# Patient Record
Sex: MALE | Race: ASIAN | NOT HISPANIC OR LATINO | Employment: STUDENT | URBAN - METROPOLITAN AREA
[De-identification: names, ages, dates, MRNs, and addresses within clinical notes are randomized per-mention and may not be internally consistent; named-entity substitution may affect disease eponyms.]

---

## 2020-06-05 ENCOUNTER — TELEPHONE (OUTPATIENT)
Dept: PSYCHIATRY | Facility: CLINIC | Age: 25
End: 2020-06-05

## 2020-06-05 NOTE — TELEPHONE ENCOUNTER
-Per patient he is looking for long term management of his OCD.  States he has not had much in the way of treatment for it.  States he is only seeing a therapist right now, and is looking for a long term psychiatrist.  Discussed that our clinic might not be a good fit for this as we usually only act as a consult.     -Writer agreed to reach out to our provider here that specializes in OCD to see if he had any recommendations for psychiatrists in the community that treat OCD.

## 2020-06-05 NOTE — TELEPHONE ENCOUNTER
What is the concern that needs to be addressed by a nurse? Patient is interested in seeking care for OCD.  Patient tried Seratonin to control OCD, but stopped this 3 or 4 months ago.  He felt that his OCD was better on Seratonin but now he feels that it isn't effective.  His symptoms are mostly checking, more related to his academics.  Had racing thoughts in the past but is able to control these a little better. Feels he's had symptoms in his childhood but this has increased now that he has more responsibilities.  Hoping to see if he is appropriate for our clinic, or asking if we could refer to another specialist.    May a detailed message be left on voicemail? Yes or MyChart preferred    Message routed to: Libia Quinones RNCC

## 2020-06-08 NOTE — TELEPHONE ENCOUNTER
Lukas Rodas MD Swanson, Melanie A RN    Caller: Unspecified (3 days ago, 10:52 AM)               I would say [facility], b/c honestly, they are as good as anyone else. And make it clear to him that that means they'll be supervised by experts, including me.    Previous Messages

## 2020-08-25 ENCOUNTER — TELEPHONE (OUTPATIENT)
Dept: DERMATOLOGY | Facility: CLINIC | Age: 25
End: 2020-08-25

## 2020-08-25 ENCOUNTER — VIRTUAL VISIT (OUTPATIENT)
Dept: DERMATOLOGY | Facility: CLINIC | Age: 25
End: 2020-08-25
Payer: COMMERCIAL

## 2020-08-25 DIAGNOSIS — L81.8 PERIORBITAL HYPERPIGMENTATION: Primary | ICD-10-CM

## 2020-08-25 RX ORDER — HYDROQUINONE 40 MG/G
CREAM TOPICAL
Qty: 28.35 G | Refills: 1 | Status: SHIPPED | OUTPATIENT
Start: 2020-08-25

## 2020-08-25 ASSESSMENT — PAIN SCALES - GENERAL: PAINLEVEL: NO PAIN (0)

## 2020-08-25 NOTE — PATIENT INSTRUCTIONS
"[facility] Dermatology Visit    Thank you for allowing us to participate in your care. Your findings, instructions and follow-up plan are as follows:    For the darkness under the eyes, this is essentially your normal skin.  It's called \"periorbital hyperpigmentation\".  It is most important to use a good sunscreen. Best ones have titanium dioxide or zinc oxide. Can try a tinted sunscreen to help it blend in more.  Bleaching creams might help but not always and sometimes can make it worse.   Can start hydroquinone daily to affected area but it's very important to only use daily for 3 months, then take 3 month break.   Insurance does not cover this medication so can check goodrx.com.    For the arms, this is called keratosis pilaris. This is essentially normal skin too.  You can't \"cure\" this but we can try to lessen the appearance.  You can try some creams which you can buy over the counter such as amlactin or urea 10% cream.    If you get pus bumps, which are a sign of inflamed hair follicles, can use benzoyl peroxide 5% wash daily in shower. This can be purchased over the counter at [employer] or [employer] ([employer] makes this product). It can be found in a purple tube in the acne aisle. Be sure to rinse thoroughly with use as it can bleach towels and clothing.    See handouts below for good skin care products and anti-dandruff shampoos.    Return in 3 months, sooner if concerns.    Dandruff Shampoos    What do I use dandruff shampoos for?    Flaking    Redness    Itching    How and when do I use these shampoos?    Rub gently into scalp.    Leave on for at least five minutes before rinsing.    You will feel better with daily use.    As redness, flaking, and itching get better you can use the shampoo less    Using two shampoos with different active elements may work best. Switch shampoos each week.    What shampoos can I buy?  Below is a list of active elements that help with itching, redness, " and flaking. Name brand shampoos having these elements are included.  Non-brand shampoos that have these active elements can also be used.     Selenium Sulfide  o Blue Head and Shoulders (1% selenium sulfide)  o Selsun Blue (1% selenium sulfide)  o Selsun Gold (2.5% selenium sulfide, prescription needed)    Salicylic Acid  o Neutrogena T/Sal  (3% salicylic acid)  o Sebulex  (2% sulfur, 2% salicylic acid)  o Ionil  (2% salicylic acid)    Pyrithione Zinc  o White Head and Shoulders (1% pyrithione zinc)  o DHS Zinc Shampoo (1% pyrithione zinc)    Ketoconazole  o Nizoral  (1% ketoconazole)    Tar (Note: tar may turn white/gray hair yellow if used often)  o Neutrogena T/Gel (0.5% coal tar)  o Neutrogena T/Gel extra strength (1% coal tar)  o DHS Tar Shampoo (0.5% coal tar)        Gentle Skin Care    Below is a list of products our providers recommend for gentle skin care.  Moisturizers:    Lighter; Exederm Intensive Moisture Cream, Cetaphil Cream, CeraVe, Aveeno Positively radiant and Vanicream Light     Thicker; Aquaphor Ointment, Vaseline, Petroleum Jelly, Eucerin Original Healing Cream and Vanicream, CeraVe Healing Ointment, Aquaphor Body Spray    Avoid Lotions (too thin)  Mild Cleansers:    Dove- Fragrance Free bar or wash    CeraVe     Vanicream Cleansing bar    Cetaphil Cleanser     Aquaphor 2 in1 Gentle Wash and Shampoo    Dove Baby wash    Exederm Body wash       Laundry Products:      All Free and Clear    Cheer Free    Generic Brands are okay as long as they are  Fragrance Free      Avoid fabric softeners  and dryer sheets   Sunscreens: SPF 30 or greater       Sunscreens that contain Zinc Oxide and/or Titanium Dioxide should be applied, these are physical blockers. One or both of these should be listed in the  Active Ingredients     Any other listed ingredients under the active ingredients would be a chemically based sunscreen which might be irritating.    Spray sunscreens should be avoided because these are  typically chemical sunscreens.      Shampoo and Conditioners:    Free and Clear by Vanicream    Aquaphor 2 in 1 Gentle Wash and Shampoo   Oils:    Mineral Oil     Emu Oil     For some patients: Coconut (raw, unrefined, organic) and Sunflower seed oil        Generic Products are an okay substitute, but make sure they are fragrance free.  *Reading the product ingredients list is very important  *Avoid product that have fragrance added to them.   *Organic does not mean  fragrance free.  In fact patients with sensitive skin can become quite irritated by some organic products.     1. Daily bathing is recommended. Make sure you are applying a good moisturizer after bathing every time.  2. Use Moisturizing creams at least twice daily to the whole body. Your provider may recommend a lighter or heavier moisturizer based on your child s severity and that time of year it is.  3. Creams are more moisturizing than lotions.     Care Plan:  1. Keep bathing and showering short, less than 15 minutes   2. Always use lukewarm warm when possible. AVOID HOT or COLD water  3. DO NOT use bubble bath  4. Limit the use of soaps. Focus on the skin folds, face, armpits, groin and feet towards the end of the bath  5. Do NOT vigorously scrub when you cleanse the skin  6. After bathing, PAT your skin lightly with a towel. DO NOT rub or scrub when drying  7. ALWAYS apply a moisturizer immediately after bathing. This helps to  lock in  the moisture. * IF YOU WERE PRESCRIBED A TOPICAL MEDICATION, APPLY YOUR MEDICATION FIRST THEN COVER WITH YOUR DAILY MOISTURIZER  8. Reapply moisturizing agents at least twice daily to your whole body    Other helpful tips:    Do not use products such as powders, perfumes, or colognes on your skin    Diffusers can be harsh on sensitive skin, use with caution if you or your child has sensitive skin     Avoid saunas and steam baths. This temperature is too HOT    Avoid tight or  scratchy  clothing such as  wool    Always wash new clothing before wearing them for the first time  Sometimes a humidifier or vaporizer can be used at night can help the dry skin. Remember to keep these items clean to avoid mold growth.      When should I call my doctor?    If you are worsening or not improving, please, contact us or seek urgent care as noted below.     Who should I call with questions (adults)?    [facility] (adult and pediatric): [phone]     [facility] (adult): [phone]    For urgent needs outside of business hours call the [facility] at [phone] and ask for the dermatology resident on call    If this is a medical emergency and you are unable to reach an ER, Call [phone]      Who should I call with questions (pediatric)?  [facility] Pediatric Dermatology  Dr. Angie Tejada, Dr. Lesia Stovall, Dr. Ivelisse Arriaga, Diann Mathews, PA  Dr. Ronit Hernández, Dr. Nettie Angulo & Dr. Remigio Foster  Non Urgent  Nurse Triage Line; [phone]- Patsy and Radha PHAN Care Coordinators   Samantha (/Complex ) [phone]    If you need a prescription refill, please contact your pharmacy. Refills are approved or denied by our Physicians during normal business hours, Monday through Fridays  Per office policy, refills will not be granted if you have not been seen within the past year (or sooner depending on your child's condition)    Scheduling Information:  Pediatric Appointment Scheduling and Call Center [phone]  Radiology Scheduling- [phone]  Sedation Unit Scheduling- [phone]  [place] Scheduling- General [phone]; Pediatric Dermatology [phone]  Main  Services: [phone]  Romanian: [phone]  Bahraini: [phone]  Hmong/Graham/Bulgarian: [phone]  Preadmission Nursing Department Fax Number: [phone] (Fax all pre-operative paperwork to this number)    For  urgent matters arising during evenings, weekends, or holidays that cannot wait for normal business hours please call [phone] and ask for the Dermatology Resident On-Call to be paged.

## 2020-08-25 NOTE — PROGRESS NOTES
"[facility] Dermatology Record:  Store and Forward and Video ( Invitation sent by:  Boxever waiting room )      Dermatology Problem List:  ***    Encounter Date: Aug 25, 2020    CC:   Chief Complaint   Patient presents with     Derm Problem     Toni is wanting to discuss dark pigmentation under his eyes and bumps on his arms.       History of Present Illness:  Toni Don is a 25 year old male who presents for ***.      ROS: Patient is generally feeling well today ***    Physical Examination:  General: Well-appearing***, appropriately-developed individual.  Skin: Focused examination including *** was performed.   -***    Labs:  ***    Past Medical History:   There is no problem list on file for this patient.    History reviewed. No pertinent past medical history.  History reviewed. No pertinent surgical history.    Social History:  Patient reports that he has never smoked. He has never used smokeless tobacco.    Family History:  History reviewed. No pertinent family history.    Medications:  No current outpatient medications on file.     No current facility-administered medications for this visit.           No Known Allergies        Impression and Recommendations (Patient Counseled on the Following):  1. ***  -{plan:482314}    2. ***  -{plan:451022}      Follow-up:   {follow-up:235628}     {umdermtelestaffinvolved:002271::\"Staff only\"}    ***  _____________________________________________________________________________    Teledermatology information:  - Location of patient: [place]  - Patient presented as: {self referral other:077781::\"return\"}  - Location of teledermatologist:  ([facility] DERMATOLOGY )  - Reason teledermatology is appropriate:  {umdermtelereason:071070}  - Image quality and interpretability: {imagequality:117386}  - Physician has received verbal consent for a Video/Photos Visit from the patient? {YES-NO  Default Yes:4444::\"Yes\"}  - In-person dermatology visit recommendation: {visit " needed:908348}  - Date of images: ***  - Service start time:***  - Service end time:***  - Date of report: 8/25/2020

## 2020-08-25 NOTE — TELEPHONE ENCOUNTER
Called pt and LVM. I wanted to schedule him a 3 month telephone with photos follow up with Dr. Pierre. Clinic number provided.  BHAVNA Kelly

## 2020-08-25 NOTE — NURSING NOTE
Dermatology Rooming Note    Toni Don's goals for this visit include:   Chief Complaint   Patient presents with     Derm Problem     Toni is wanting to discuss dark pigmentation under his eyes and bumps on his arms.     BHAVNA Kelly      No

## 2020-08-25 NOTE — LETTER
8/25/2020       RE: Toni Don  [address]  [place] [address]     Dear Colleague,    Thank you for referring your patient, Toni Don, to the [facility] DERMATOLOGY at [facility]. Please see a copy of my visit note below.    [facility] Dermatology Record:  [facility]      Dermatology Problem List:  1. Periorbital hyperpigmentation.  - Pt to consider hydroquinone  2. Keratosis pilaris.    Encounter Date: Aug 25, 2020    CC:   Chief Complaint   Patient presents with     Derm Problem     Toni is wanting to discuss dark pigmentation under his eyes and bumps on his arms.       History of Present Illness:  Toni Don is a 25 year old male who presents for darkness under eyes and spots on arms.    Started noticing dark circles under eyes about 3 years. Getting worse.    Has bumps on arms. Been like there forever.  They become dead skin over time.  Used to get pus bumps but not really anymore.  No history of asthma, allergies, or eczema.    No personal or family history of skin cancer.     Has some qs on shampoos and skin care in general.    ROS: Patient is generally feeling well today.    Physical Examination:  General: Well-appearing male, appropriately-developed individual.  Skin: Focused examination including posterior shoulder/arm and face/neck/upper chest was performed.   -few scattered pustules on posterior upper arm  -mild periorbital hyperpigmentation.             Labs:  n/a    Past Medical History:   There is no problem list on file for this patient.    History reviewed. No pertinent past medical history.  History reviewed. No pertinent surgical history.    Social History:  Patient reports that he has never smoked. He has never used smokeless tobacco.    Family History:  History reviewed. No pertinent family history.    Medications:  No current outpatient medications on file.     No current facility-administered medications for this visit.           No  Known Allergies        Impression and Recommendations (Patient Counseled on the Following):  1. Periorbital hyperpigmentation. Natural history and etiology discussed. Advised this is essentially normal skin type. Could try bleaching creams but may not be effective and may have paradoxical darkening with overuse. He may consider hydroquinone and asked that prescription be sent. Strict photoprotection also advised.  -Patient to consider hydroquinone - if he does use, advised to start hydroquinone 4% cream daily for up to 3 months, then take 3 month break. Counseled on side effect of ochronosis.  -Strict photoprotection advised.    2. Keratosis pilaris. Natural history and etiology discussed. Advised unable to cure but can sometimes improve appearance with topical keratolytics. May also have admixed folliculitis and advised could trial BP wash.  - Consider urea 10% cream or Amlactin daily  - Consider starting benzoyl peroxide 5% wash daily in shower. Counseled can bleach towels and clothing.    3. Gentle skin cares:  - Discussed daily moisturization with bland emollient.   - Provided list of gentle moisturizers, facial cleansers, anti-dandruff shampoos      Follow-up:   Follow-up with dermatology in approximately 3 months. Earlier for new or changing lesions or rash.      Staff only    Staci Pierre MD    Department of Dermatology  [facility]: Phone: [phone], Fax: [phone]  8/25/2020    _____________________________________________________________________________    Teledermatology information:  - Location of patient: [place]  - Patient presented as: self referral  - Location of teledermatologist:  ([facility] DERMATOLOGY )  - Reason teledermatology is appropriate:  of National Emergency Regarding Coronavirus disease (COVID 19) Outbreak  - Image quality and interpretability: acceptable  - Physician has received  verbal consent for a Video/Photos Visit from the patient? Yes  - In-person dermatology visit recommendation: no  - Date of images: 8/24/2020  - Service start time: 8:13 AM   - Service end time: 8:26 AM   - Date of report: 8/25/2020

## 2020-08-25 NOTE — PROGRESS NOTES
[facility] Dermatology Record:  [facility]      Dermatology Problem List:  1. Periorbital hyperpigmentation.  - Pt to consider hydroquinone  2. Keratosis pilaris.    Encounter Date: Aug 25, 2020    CC:   Chief Complaint   Patient presents with     Derm Problem     Toni is wanting to discuss dark pigmentation under his eyes and bumps on his arms.       History of Present Illness:  Toni Don is a 25 year old male who presents for darkness under eyes and spots on arms.    Started noticing dark circles under eyes about 3 years. Getting worse.    Has bumps on arms. Been like there forever.  They become dead skin over time.  Used to get pus bumps but not really anymore.  No history of asthma, allergies, or eczema.    No personal or family history of skin cancer.     Has some qs on shampoos and skin care in general.    ROS: Patient is generally feeling well today.    Physical Examination:  General: Well-appearing male, appropriately-developed individual.  Skin: Focused examination including posterior shoulder/arm and face/neck/upper chest was performed.   -few scattered pustules on posterior upper arm  -mild periorbital hyperpigmentation.             Labs:  n/a    Past Medical History:   There is no problem list on file for this patient.    History reviewed. No pertinent past medical history.  History reviewed. No pertinent surgical history.    Social History:  Patient reports that he has never smoked. He has never used smokeless tobacco.    Family History:  History reviewed. No pertinent family history.    Medications:  No current outpatient medications on file.     No current facility-administered medications for this visit.           No Known Allergies        Impression and Recommendations (Patient Counseled on the Following):  1. Periorbital hyperpigmentation. Natural history and etiology discussed. Advised this is essentially normal skin type. Could try bleaching creams but may not be effective and may  have paradoxical darkening with overuse. He may consider hydroquinone and asked that prescription be sent. Strict photoprotection also advised.  -Patient to consider hydroquinone - if he does use, advised to start hydroquinone 4% cream daily for up to 3 months, then take 3 month break. Counseled on side effect of ochronosis.  -Strict photoprotection advised.    2. Keratosis pilaris. Natural history and etiology discussed. Advised unable to cure but can sometimes improve appearance with topical keratolytics. May also have admixed folliculitis and advised could trial BP wash.  - Consider urea 10% cream or Amlactin daily  - Consider starting benzoyl peroxide 5% wash daily in shower. Counseled can bleach towels and clothing.    3. Gentle skin cares:  - Discussed daily moisturization with bland emollient.   - Provided list of gentle moisturizers, facial cleansers, anti-dandruff shampoos      Follow-up:   Follow-up with dermatology in approximately 3 months. Earlier for new or changing lesions or rash.      Staff only    Staci Pierre MD    Department of Dermatology  [facility]: Phone: [phone], Fax: [phone]  8/25/2020    _____________________________________________________________________________    Teledermatology information:  - Location of patient: [place]  - Patient presented as: self referral  - Location of teledermatologist:  ([facility] DERMATOLOGY )  - Reason teledermatology is appropriate:  of National Emergency Regarding Coronavirus disease (COVID 19) Outbreak  - Image quality and interpretability: acceptable  - Physician has received verbal consent for a Video/Photos Visit from the patient? Yes  - In-person dermatology visit recommendation: no  - Date of images: 8/24/2020  - Service start time: 8:13 AM   - Service end time: 8:26 AM   - Date of report: 8/25/2020

## 2020-11-25 ENCOUNTER — VIRTUAL VISIT (OUTPATIENT)
Dept: DERMATOLOGY | Facility: CLINIC | Age: 25
End: 2020-11-25
Payer: COMMERCIAL

## 2020-11-25 DIAGNOSIS — L81.8 PERIORBITAL HYPERPIGMENTATION: Primary | ICD-10-CM

## 2020-11-25 PROCEDURE — 99213 OFFICE O/P EST LOW 20 MIN: CPT | Mod: 95 | Performed by: DERMATOLOGY

## 2020-11-25 RX ORDER — TRETINOIN 0.25 MG/G
CREAM TOPICAL
Qty: 45 G | Refills: 11 | Status: SHIPPED | OUTPATIENT
Start: 2020-11-25

## 2020-11-25 ASSESSMENT — PAIN SCALES - GENERAL: PAINLEVEL: NO PAIN (0)

## 2020-11-25 NOTE — NURSING NOTE
"Dermatology Rooming Note    Toni Don's goals for this visit include:   Chief Complaint   Patient presents with     Derm Problem     Toni is following up on periorbital hyperpigmentation- Toni states \"I think it's improved\".      Becky Wu, RMA     "

## 2020-11-25 NOTE — LETTER
"11/25/2020       RE: Toni Don  [address]  [facility] [address]     Dear Colleague,    Thank you for referring your patient, Toni Don, to the [facility] DERMATOLOGY CLINIC [place] at [facility]. Please see a copy of my visit note below.    [facility] Dermatology Record:  Store and Forward and Video ( Invitation sent by:  [employer] waiting room )      Dermatology Problem List:  1. Periorbital hyperpigmentation.  - Has hydroquinone, tretinoin  2. Keratosis pilaris.  - Amalctin or urea 10% cream  3. Folliculitis.  - BP wash    Encounter Date: Nov 25, 2020    CC:   Chief Complaint   Patient presents with     Derm Problem     Toni is following up on periorbital hyperpigmentation- Toni states \"I think it's improved\".        History of Present Illness:  Toni Don is a 25 year old male who presents for follow-up periorbital hyperpigmentation.    We last talked 8/25/2020 and started hydroquinone and sun protection.  Since last visit, he has been doing well.  Hasn't been using the hydroquinone because was stressed with recent exam.    Recently started eating more eggs/protein and working out more. He notes white bumps on his back and arms.    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      ROS: Patient is generally feeling well today.    Physical Examination:  General: Well-appearing male, appropriately-developed individual.  Skin: Focused examination including face was performed.   -mild hyperpigmentation infraorbital cheeks.            Labs:  n/a    Past Medical History:   There is no problem list on file for this patient.    No past medical history on file.  No past surgical history on file.    Social History:  Patient reports that he has never smoked. He has never used smokeless tobacco.    Family History:  No family history on file.    Medications:  Current Outpatient Medications   Medication     " hydroquinone (RENETTA) 4 % external cream     No current facility-administered medications for this visit.           No Known Allergies        Impression and Recommendations (Patient Counseled on the Following):  1. Periorbital hyperpigmentation, he notes some improvement recently with improvement in sleep. Didn't use hydroquinone much. Discussed options today of adding tretinoin versus more consistent trial of hydroquinone versus watchful waiting. He will consider and may trial some of these options. He is aware he needs to take a break on the hydroquinone if he does use.  - Continue photoprotection  - Consider 3-month trial of hydroquinone  - Consider tretinoin 0.025% cream at bedtime. Counseled on side effects of xerosis. Recommended to start every other night, then increase to nightly as tolerated. Follow with non-comedogenic moisturizer.  - Continue photoprotection    2. Folliculitis (per history, no photos). Exacerbated by exercise and high protein foods.  - Start benzoyl peroxide 5% wash daily in shower. Counseled can bleach towels and clothing.  - If doesn't help, he will send message and we can send clindamycin lotion.    3. Keratosis pilaris. Natural history and etiology discussed.  - Advised can trial urea 10% cream or amlactin if desired    Follow-up:   Follow-up with dermatology prn. Earlier for new or changing lesions or rash.      Staff only    Staci Pierre MD    Department of Dermatology  [facility]: Phone: [phone], Fax: [phone]  11/25/2020    _____________________________________________________________________________    Teledermatology information:  - Location of patient: [place]  - Patient presented as: return  - Location of teledermatologist:  ([facility] DERMATOLOGY CLINIC [place] )  - Image quality and interpretability: acceptable  - Physician has received verbal consent for  a Video/Photos Visit from the patient? YES  - In-person dermatology visit recommendation: no  - Date of images: 11/24/2020  - Service start time: 7:55 AM  - Service end time: 8:06 AM   - Date of report: 11/25/2020

## 2020-11-25 NOTE — PROGRESS NOTES
"[facility] Dermatology Record:  Store and Forward and Video ( Invitation sent by:  "[employer] waiting room )      Dermatology Problem List:  1. Periorbital hyperpigmentation.  - Has hydroquinone, tretinoin  2. Keratosis pilaris.  - Amalctin or urea 10% cream  3. Folliculitis.  - BP wash    Encounter Date: Nov 25, 2020    CC:   Chief Complaint   Patient presents with     Derm Problem     Toni is following up on periorbital hyperpigmentation- Toni states \"I think it's improved\".        History of Present Illness:  Toni Don is a 25 year old male who presents for follow-up periorbital hyperpigmentation.    We last talked 8/25/2020 and started hydroquinone and sun protection.  Since last visit, he has been doing well.  Hasn't been using the hydroquinone because was stressed with recent exam.    Recently started eating more eggs/protein and working out more. He notes white bumps on his back and arms.    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      ROS: Patient is generally feeling well today.    Physical Examination:  General: Well-appearing male, appropriately-developed individual.  Skin: Focused examination including face was performed.   -mild hyperpigmentation infraorbital cheeks.            Labs:  n/a    Past Medical History:   There is no problem list on file for this patient.    No past medical history on file.  No past surgical history on file.    Social History:  Patient reports that he has never smoked. He has never used smokeless tobacco.    Family History:  No family history on file.    Medications:  Current Outpatient Medications   Medication     hydroquinone (RENETTA) 4 % external cream     No current facility-administered medications for this visit.           No Known Allergies        Impression and Recommendations (Patient Counseled on the Following):  1. Periorbital hyperpigmentation, he notes some improvement recently with improvement in sleep. " Didn't use hydroquinone much. Discussed options today of adding tretinoin versus more consistent trial of hydroquinone versus watchful waiting. He will consider and may trial some of these options. He is aware he needs to take a break on the hydroquinone if he does use.  - Continue photoprotection  - Consider 3-month trial of hydroquinone  - Consider tretinoin 0.025% cream at bedtime. Counseled on side effects of xerosis. Recommended to start every other night, then increase to nightly as tolerated. Follow with non-comedogenic moisturizer.  - Continue photoprotection    2. Folliculitis (per history, no photos). Exacerbated by exercise and high protein foods.  - Start benzoyl peroxide 5% wash daily in shower. Counseled can bleach towels and clothing.  - If doesn't help, he will send message and we can send clindamycin lotion.    3. Keratosis pilaris. Natural history and etiology discussed.  - Advised can trial urea 10% cream or amlactin if desired    Follow-up:   Follow-up with dermatology prn. Earlier for new or changing lesions or rash.      Staff only    Staci Pierre MD    Department of Dermatology  [facility]: Phone: [phone], Fax: [phone]  11/25/2020    _____________________________________________________________________________    Teledermatology information:  - Location of patient: [place]  - Patient presented as: return  - Location of teledermatologist:  ([facility] DERMATOLOGY CLINIC [place] )  - Image quality and interpretability: acceptable  - Physician has received verbal consent for a Video/Photos Visit from the patient? YES  - In-person dermatology visit recommendation: no  - Date of images: 11/24/2020  - Service start time: 7:55 AM  - Service end time: 8:06 AM   - Date of report: 11/25/2020

## 2020-11-25 NOTE — PATIENT INSTRUCTIONS
[facility] Dermatology Visit    Thank you for allowing us to participate in your care. Your findings, instructions and follow-up plan are as follows:    For your eyes (periorbital hyperpgimentation):  - Can trial: tretinoin 0.025% cream at bedtime. Apply pea-sized amount to face. This can be drying so please start every other night, then increase to nightly as tolerated after 1-2 weeks. You can use this lifelong. Check goodrx.com for coupons.  - Can trial hydroquinone nightly (can only use for 3 months, then need 3 month break).  - Can do nothing :)    For keratosis pilaris:  - Try urea 10% cream or amlactin    For folliculitis:  - Start benzoyl peroxide 5% wash daily in shower. This can be purchased over the counter at [employer] or [employer] ([employer] makes this product). It can be found in a purple tube in the acne aisle. Be sure to rinse thoroughly with use as it can bleach towels and clothing.  - If doesn't help, send me a message and I can send you a topical antibiotic called clindamycin.    Return as needed, good luck with your phd!      When should I call my doctor?    If you are worsening or not improving, please, contact us or seek urgent care as noted below.     Who should I call with questions (adults)?    [facility] (adult and pediatric): [phone]     [facility] (adult): [phone]    For urgent needs outside of business hours call the [facility] at [phone] and ask for the dermatology resident on call    If this is a medical emergency and you are unable to reach an ER, Call [phone]      Who should I call with questions (pediatric)?  [facility]- Pediatric Dermatology  Dr. Angie Tejada, Dr. Lesia Stovall, Dr. Ivelisse Arriaga, Diann Mathews, CLARITA eHrnández, Dr. Nettie Angulo & Dr. Remigio Foster  Non Urgent  Nurse Triage Line; [phone]- Patsy and Radha PHAN Care  Coordinators   Samantha (/Complex ) [phone]    If you need a prescription refill, please contact your pharmacy. Refills are approved or denied by our Physicians during normal business hours, Monday through Fridays  Per office policy, refills will not be granted if you have not been seen within the past year (or sooner depending on your child's condition)    Scheduling Information:  Pediatric Appointment Scheduling and Call Center [phone]  Radiology Scheduling- [phone]  Sedation Unit Scheduling- [phone]  [place] Scheduling- [facility] [phone]; Pediatric Dermatology [phone]  Main  Services: [phone]  Dominican: [phone]  Marshallese: [phone]  Hmong/Latvian/Citizen of the [place]: [phone]  Preadmission Nursing Department Fax Number: [phone] (Fax all pre-operative paperwork to this number)    For urgent matters arising during evenings, weekends, or holidays that cannot wait for normal business hours please call [phone] and ask for the Dermatology Resident On-Call to be paged.

## 2021-01-04 ENCOUNTER — HEALTH MAINTENANCE LETTER (OUTPATIENT)
Age: 26
End: 2021-01-04

## 2021-04-09 ENCOUNTER — IMMUNIZATION (OUTPATIENT)
Dept: NURSING | Facility: CLINIC | Age: 26
End: 2021-04-09
Payer: COMMERCIAL

## 2021-04-09 PROCEDURE — 91300 PR COVID VAC PFIZER DIL RECON 30 MCG/0.3 ML IM: CPT

## 2021-04-09 PROCEDURE — 0001A PR COVID VAC PFIZER DIL RECON 30 MCG/0.3 ML IM: CPT

## 2021-04-30 ENCOUNTER — IMMUNIZATION (OUTPATIENT)
Dept: NURSING | Facility: CLINIC | Age: 26
End: 2021-04-30
Attending: PHARMACIST
Payer: COMMERCIAL

## 2021-04-30 PROCEDURE — 91300 PR COVID VAC PFIZER DIL RECON 30 MCG/0.3 ML IM: CPT

## 2021-04-30 PROCEDURE — 0002A PR COVID VAC PFIZER DIL RECON 30 MCG/0.3 ML IM: CPT

## 2021-05-31 ENCOUNTER — NURSE TRIAGE (OUTPATIENT)
Dept: NURSING | Facility: CLINIC | Age: 26
End: 2021-05-31

## 2021-05-31 ENCOUNTER — APPOINTMENT (OUTPATIENT)
Dept: GENERAL RADIOLOGY | Facility: CLINIC | Age: 26
End: 2021-05-31
Attending: INTERNAL MEDICINE
Payer: COMMERCIAL

## 2021-05-31 ENCOUNTER — HOSPITAL ENCOUNTER (EMERGENCY)
Facility: CLINIC | Age: 26
Discharge: HOME | End: 2021-05-31
Attending: INTERNAL MEDICINE | Admitting: INTERNAL MEDICINE
Payer: COMMERCIAL

## 2021-05-31 VITALS
WEIGHT: 144 LBS | HEART RATE: 48 BPM | SYSTOLIC BLOOD PRESSURE: 128 MMHG | DIASTOLIC BLOOD PRESSURE: 74 MMHG | OXYGEN SATURATION: 98 % | RESPIRATION RATE: 14 BRPM | BODY MASS INDEX: 22.6 KG/M2 | HEIGHT: 67 IN | TEMPERATURE: 98.3 F

## 2021-05-31 DIAGNOSIS — S93.402A SPRAIN OF LEFT ANKLE, UNSPECIFIED LIGAMENT, INITIAL ENCOUNTER: ICD-10-CM

## 2021-05-31 PROCEDURE — 73610 X-RAY EXAM OF ANKLE: CPT | Mod: LT

## 2021-05-31 PROCEDURE — 73610 X-RAY EXAM OF ANKLE: CPT | Mod: 26 | Performed by: RADIOLOGY

## 2021-05-31 PROCEDURE — 99282 EMERGENCY DEPT VISIT SF MDM: CPT | Performed by: INTERNAL MEDICINE

## 2021-05-31 PROCEDURE — 99283 EMERGENCY DEPT VISIT LOW MDM: CPT | Performed by: INTERNAL MEDICINE

## 2021-05-31 ASSESSMENT — ENCOUNTER SYMPTOMS
NECK STIFFNESS: 0
ARTHRALGIAS: 0
FEVER: 0
ABDOMINAL PAIN: 0
SHORTNESS OF BREATH: 0
COLOR CHANGE: 0
EYE REDNESS: 0
HEADACHES: 0
CONFUSION: 0
DIFFICULTY URINATING: 0

## 2021-05-31 ASSESSMENT — MIFFLIN-ST. JEOR: SCORE: 1596.81

## 2021-05-31 NOTE — TELEPHONE ENCOUNTER
Toni has pain in his ankle after spraining it playing games last night.  He might go to urgent care today to have checked out otherwise he can call [facility] tomorrow as needed.  He agrees to this plan.     Reason for Disposition    Minor ankle or foot injury    Protocols used: ANKLE AND FOOT INJURY-A-[place]

## 2021-05-31 NOTE — ED TRIAGE NOTES
Pt 'rolled' ankle last night playing basketball 19:30> Pt took ibuprofen last night. No OTC meds taken today.

## 2021-05-31 NOTE — ED PROVIDER NOTES
[place] EMERGENCY DEPARTMENT ([facility])  5/31/21     History     Chief Complaint   Patient presents with     Ankle Pain     Sprain     The history is provided by the patient and medical records.     Toni Don is an otherwise healthy 25 year old male who presents here to the Emergency Department due to left ankle pain.  Patient reports he was playing basketball last night when his hurt his left ankle.  He states it twisted outwards.  Patient states he was able to continue playing and has been able to walk.  He states he woke up in the middle of the night with pain at the medial aspect of his left ankle.  Patient notes he took some pain medication and went back to bed.      Past Medical History  History reviewed. No pertinent past medical history.  History reviewed. No pertinent surgical history.  hydroquinone (RENETTA) 4 % external cream  tretinoin (RETIN-A) 0.025 % external cream      No Known Allergies  Family History  No family history on file.  Social History   Social History     Tobacco Use     Smoking status: Never Smoker     Smokeless tobacco: Never Used   Substance Use Topics     Alcohol use: None     Drug use: None      Past medical history, past surgical history, medications, allergies, family history, and social history were reviewed with the patient. No additional pertinent items.       Review of Systems   Constitutional: Negative for fever.   HENT: Negative for congestion.    Eyes: Negative for redness.   Respiratory: Negative for shortness of breath.    Cardiovascular: Negative for chest pain.   Gastrointestinal: Negative for abdominal pain.   Genitourinary: Negative for difficulty urinating.   Musculoskeletal: Negative for arthralgias and neck stiffness.        Pos for L ankle pain   Skin: Negative for color change.   Neurological: Negative for headaches.   Psychiatric/Behavioral: Negative for confusion.   All other systems reviewed and are negative.    A complete review of  "systems was performed with pertinent positives and negatives noted in the HPI, and all other systems negative.    Physical Exam   BP: 138/68  Pulse: 69  Temp: 98.4  F (36.9  C)  Resp: 16  Height: 170.2 cm (5' 7\")  Weight: 65.3 kg (144 lb)  SpO2: 97 %  Physical Exam  Constitutional:       General: He is not in acute distress.     Appearance: He is not diaphoretic.   HENT:      Head: Atraumatic.   Eyes:      General: No scleral icterus.     Pupils: Pupils are equal, round, and reactive to light.   Cardiovascular:      Rate and Rhythm: Regular rhythm.      Heart sounds: Normal heart sounds.   Pulmonary:      Effort: No respiratory distress.      Breath sounds: Normal breath sounds.   Chest:      Chest wall: No tenderness.   Abdominal:      General: Bowel sounds are normal.      Palpations: Abdomen is soft.      Tenderness: There is no abdominal tenderness.   Musculoskeletal:      Left ankle: He exhibits normal range of motion, no swelling, no ecchymosis, no deformity, no laceration and normal pulse. Tenderness. No lateral malleolus, no medial malleolus, no AITFL, no CF ligament, no posterior TFL, no head of 5th metatarsal and no proximal fibula tenderness found. Achilles tendon normal.      Cervical back: He exhibits no tenderness.      Thoracic back: He exhibits no tenderness.      Lumbar back: He exhibits no tenderness.        Feet:    Skin:     General: Skin is warm.      Findings: No rash.   Neurological:      Mental Status: He is oriented to person, place, and time.         ED Course     10:24 AM  The patient was seen and examined by Yash More MD in Room ED16.    Procedures               Results for orders placed or performed during the hospital encounter of 05/31/21   XR Ankle Left G/E 3 Views     Status: None    Narrative    3 views left ankle radiographs 5/31/2021 10:57 AM    History: pain    Comparison: None available.    Findings:    Standing AP, oblique, and lateral  views of the left ankle " were  obtained.     No acute osseous abnormality.      Ankle mortise and syndesmosis are congruent on this non-weight bearing  images.    Mild soft tissue swelling/prominence over lateral malleolus.      Impression    Impression:  1. Mild soft tissue swelling/prominence over lateral malleolus. No  acute osseous abnormality.  2. No substantial degenerative change.    ALLIE TINOCO     Medications - No data to display     Assessments & Plan (with Medical Decision Making)  Left ankle injury, XR neg, exam mildly tender on tendon but no erythema or swelling, most consistent with sprain/strain, will discharge with RICE, follow up with his PMD.       I have reviewed the nursing notes. I have reviewed the findings, diagnosis, plan and need for follow up with the patient.    Discharge Medication List as of 5/31/2021 11:37 AM          Final diagnoses:   Sprain of left ankle, unspecified ligament, initial encounter   I, Allyson Owens, am serving as a trained medical scribe to document services personally performed by Yash More MD, based on the provider's statements to me.     IYash MD, was physically present and have reviewed and verified the accuracy of this note documented by Allyson Owens.     --  Yash More MD  [facility] EMERGENCY DEPARTMENT  5/31/2021     Yash More MD  05/31/21 [id number]

## 2021-06-27 ENCOUNTER — NURSE TRIAGE (OUTPATIENT)
Dept: NURSING | Facility: CLINIC | Age: 26
End: 2021-06-27

## 2021-06-27 NOTE — TELEPHONE ENCOUNTER
"  Triage Call:    Patient is calling as he has been having diarrhea during the night starting at 0400.  He has had x2 diarrhea stools, the one at 0800 was very watery.   He is having a \"slight body pain\" and feels he may have a low grade fever, but has no thermometer.  He started having a lot of gas last night after eating a [place] sor[place] sandwhich.        Pt was advised of protocol recommendation/disposition of homecare for mild diarrhea.  Encouraged to call back if it gets worse.     Ally Thornton RN on 6/27/2021 at 8:32 AM        COVID 19 Nurse Triage Plan/Patient Instructions    Please be aware that novel coronavirus (COVID-19) may be circulating in the community. If you develop symptoms such as fever, cough, or SOB or if you have concerns about the presence of another infection including coronavirus (COVID-19), please contact your health care provider or visit [email or web address].     Disposition/Instructions    Home care recommended. Follow home care protocol based instructions.    Thank you for taking steps to prevent the spread of this virus.  o Limit your contact with others.  o Wear a simple mask to cover your cough.  o Wash your hands well and often.    Resources    [facility]: About COVID-19: [email or web address]    CDC: What to Do If You're Sick: www.cdc.gov/coronavirus/2019-ncov/about/steps-when-sick.html    CDC: Ending Home Isolation: www.cdc.gov/coronavirus/2019-ncov/hcp/disposition-in-home-patients.html     CDC: Caring for Someone: www.cdc.gov/coronavirus/2019-ncov/if-you-are-sick/care-for-someone.html     [facility]: Interim Guidance for Hospital Discharge to Home: www.health.[facility].[place]/diseases/coronavirus/hcp/hospdischarge.pdf    [facility] clinical trials (COVID-19 research studies): clinicalaffairs.[facility]/umn-clinical-trials     Below are the COVID-19 hotlines at the [place] Department of Health ([facility]). Interpreters are available.   o For health questions: Call " 696.929.9307 or 1-294.865.6360 (7 a.m. to 7 p.m.)  o For questions about schools and childcare: Call 308-668-3561 or 1-298.698.9867 (7 a.m. to 7 p.m.)                   Reason for Disposition    [1] MILD diarrhea AND [2] taking antibiotics    Additional Information    Negative: Shock suspected (e.g., cold/pale/clammy skin, too weak to stand, low BP, rapid pulse)    Negative: Difficult to awaken or acting confused (e.g., disoriented, slurred speech)    Negative: Sounds like a life-threatening emergency to the triager    Negative: Vomiting also present and worse than the diarrhea    Negative: [1] Blood in stool AND [2] without diarrhea    Negative: Diarrhea in a cancer patient who is currently (or recently) receiving chemotherapy or radiation therapy, or cancer patient who has metastatic or end-stage cancer and is receiving palliative care    Negative: [1] SEVERE abdominal pain (e.g., excruciating) AND [2] present > 1 hour    Negative: [1] SEVERE abdominal pain AND [2] age > 60    Negative: [1] Blood in the stool AND [2] moderate or large amount of blood    Negative: Black or tarry bowel movements  (Exception: chronic-unchanged  black-grey bowel movements AND is taking iron pills or Pepto-bismol)    Negative: [1] Drinking very little AND [2] dehydration suspected (e.g., no urine > 12 hours, very dry mouth, very lightheaded)    Negative: Patient sounds very sick or weak to the triager    Negative: [1] SEVERE diarrhea (e.g., 7 or more times / day more than normal) AND [2] age > 60 years    Negative: [1] Constant abdominal pain AND [2] present > 2 hours    Negative: [1] Fever > 103 F (39.4 C) AND [2] not able to get the fever down using Fever Care Advice    Negative: [1] SEVERE diarrhea (e.g., 7 or more times / day more than normal) AND [2] present > 24 hours (1 day)    Negative: [1] MODERATE diarrhea (e.g., 4-6 times / day more than normal) AND [2] present > 48 hours (2 days)    Negative: [1] MODERATE diarrhea (e.g.,  4-6 times / day more than normal) AND [2] age > 70 years    Negative: Fever > 101 F (38.3 C)    Negative: Fever present > 3 days (72 hours)    Negative: Abdominal pain  (Exception: Pain clears with each passage of diarrhea stool)    Negative: [1] Blood in the stool AND [2] small amount of blood   (Exception: only on toilet paper. Reason: diarrhea can cause rectal irritation with blood on wiping)    Negative: [1] Mucus or pus in stool AND [2] present > 2 days AND [3] diarrhea is more than mild    Negative: [1] Recent antibiotic therapy (i.e., within last 2 months) AND [2] diarrhea present > 3 days since antibiotic was stopped    Negative: [1] Recent hospitalization AND [2] diarrhea present > 3 days    Negative: Weak immune system (e.g., HIV positive, cancer chemo, splenectomy, organ transplant, chronic steroids)    Negative: Tube feedings (e.g., nasogastric, g-tube, j-tube)    Negative: Travel to a foreign country in past month    Negative: [1] MILD diarrhea (e.g., 1-3 or more stools than normal in past 24 hours) without known cause AND [2] present >  7 days    Negative: Diarrhea is a chronic symptom (recurrent or ongoing AND present > 4 weeks)    Negative: SEVERE diarrhea (e.g., 7 or more times / day more than normal)    Negative: MILD-MODERATE diarrhea (e.g., 1-6 times / day more than normal)    Protocols used: DIARRHEA-A-

## 2021-07-29 ENCOUNTER — HOSPITAL ENCOUNTER (EMERGENCY)
Facility: CLINIC | Age: 26
Discharge: HOME | End: 2021-07-29
Attending: EMERGENCY MEDICINE | Admitting: EMERGENCY MEDICINE
Payer: COMMERCIAL

## 2021-07-29 VITALS
DIASTOLIC BLOOD PRESSURE: 93 MMHG | SYSTOLIC BLOOD PRESSURE: 135 MMHG | WEIGHT: 144 LBS | OXYGEN SATURATION: 98 % | TEMPERATURE: 98.8 F | RESPIRATION RATE: 16 BRPM | HEART RATE: 63 BPM | BODY MASS INDEX: 22.55 KG/M2

## 2021-07-29 DIAGNOSIS — K08.89 TOOTH PAIN: ICD-10-CM

## 2021-07-29 PROCEDURE — 99283 EMERGENCY DEPT VISIT LOW MDM: CPT | Performed by: EMERGENCY MEDICINE

## 2021-07-29 PROCEDURE — 250N000013 HC RX MED GY IP 250 OP 250 PS 637: Performed by: EMERGENCY MEDICINE

## 2021-07-29 RX ORDER — AMOXICILLIN 250 MG/1
500 CAPSULE ORAL ONCE
Status: COMPLETED | OUTPATIENT
Start: 2021-07-29 | End: 2021-07-29

## 2021-07-29 RX ORDER — AMOXICILLIN 500 MG/1
500 CAPSULE ORAL 3 TIMES DAILY
Qty: 21 CAPSULE | Refills: 0 | Status: SHIPPED | OUTPATIENT
Start: 2021-07-29 | End: 2021-08-05

## 2021-07-29 RX ORDER — OXYCODONE HYDROCHLORIDE 5 MG/1
5 TABLET ORAL EVERY 6 HOURS PRN
Qty: 8 TABLET | Refills: 0 | Status: SHIPPED | OUTPATIENT
Start: 2021-07-29

## 2021-07-29 RX ORDER — OXYCODONE HYDROCHLORIDE 5 MG/1
5 TABLET ORAL ONCE
Status: COMPLETED | OUTPATIENT
Start: 2021-07-29 | End: 2021-07-29

## 2021-07-29 RX ADMIN — AMOXICILLIN 500 MG: 250 CAPSULE ORAL at 02:26

## 2021-07-29 RX ADMIN — OXYCODONE HYDROCHLORIDE 5 MG: 5 TABLET ORAL at 02:26

## 2021-07-29 ASSESSMENT — ENCOUNTER SYMPTOMS
ABDOMINAL PAIN: 0
SHORTNESS OF BREATH: 0
FEVER: 0

## 2021-07-29 NOTE — ED PROVIDER NOTES
ED Provider Note  [facility]      History     Chief Complaint   Patient presents with     Dental Pain     HPI  Toni Don is a 26 year old otherwise healthy male presents to the ED for dental pain. Patient has pain in his upper left wisdom tooth area for the past 2 days. Patient is scheduled for an extraction on Friday 7/30/21, but the OTC medication is not helping his pain.    Past Medical History  No past medical history on file.  No past surgical history on file.  amoxicillin (AMOXIL) 500 MG capsule  oxyCODONE (ROXICODONE) 5 MG tablet  hydroquinone (RENETTA) 4 % external cream  tretinoin (RETIN-A) 0.025 % external cream      No Known Allergies  Family History  No family history on file.  Social History   Social History     Tobacco Use     Smoking status: Never Smoker     Smokeless tobacco: Never Used   Substance Use Topics     Alcohol use: Not on file     Drug use: Not on file      Past medical history, past surgical history, medications, allergies, family history, and social history were reviewed with the patient. No additional pertinent items.       Review of Systems   Constitutional: Negative for fever.   Respiratory: Negative for shortness of breath.    Cardiovascular: Negative for chest pain.   Gastrointestinal: Negative for abdominal pain.   All other systems reviewed and are negative.    A complete review of systems was performed with pertinent positives and negatives noted in the HPI, and all other systems negative.    Physical Exam   BP: (!) 133/90  Pulse: 65  Temp: 98.8  F (37.1  C)  Resp: 16  Weight: 65.3 kg (144 lb)  SpO2: 100 %  Physical Exam  Vitals and nursing note reviewed.   Constitutional:       General: He is not in acute distress.     Appearance: He is well-developed.   HENT:      Head: Normocephalic.      Mouth/Throat:      Comments: Left upper rear molar tenderness and pain  Eyes:      Extraocular Movements: Extraocular movements intact.   Pulmonary:       Effort: No respiratory distress.   Musculoskeletal:         General: No deformity.      Cervical back: Neck supple.   Skin:     General: Skin is dry.   Neurological:      Mental Status: He is alert.      Comments: Oriented   Psychiatric:         Behavior: Behavior normal.         ED Course      Procedures            No results found for any visits on 07/29/21.  Medications   oxyCODONE (ROXICODONE) tablet 5 mg (has no administration in time range)   amoxicillin (AMOXIL) capsule 500 mg (has no administration in time range)        Assessments & Plan (with Medical Decision Making)   26-year-old male presents to us with a chief complaint of tooth pain.  There is no apparent abscess on exam.  Area is tender.  This may represent a developing infection.  We will put him on antibiotics and give him a short course of pain medications.  He will follow-up in 2 days with his dentist at previously scheduled appointment.    I have reviewed the nursing notes. I have reviewed the findings, diagnosis, plan and need for follow up with the patient.    New Prescriptions    AMOXICILLIN (AMOXIL) 500 MG CAPSULE    Take 1 capsule (500 mg) by mouth 3 times daily for 7 days    OXYCODONE (ROXICODONE) 5 MG TABLET    Take 1 tablet (5 mg) by mouth every 6 hours as needed for pain       Final diagnoses:   Tooth pain       --    [facility] EMERGENCY DEPARTMENT  7/29/2021     Jorgito Dunlap,   07/29/21 0219

## 2021-07-29 NOTE — ED TRIAGE NOTES
Pt presents with pain in his left upper wisdom tooth area for 2 days, states that he is scheduled for extraction on Friday but otc is not helping

## 2021-07-29 NOTE — DISCHARGE INSTRUCTIONS
Follow-up with your dentist this Friday as scheduled.  Return to the emergency department as needed for any new or worsening symptoms.

## 2021-10-10 ENCOUNTER — HEALTH MAINTENANCE LETTER (OUTPATIENT)
Age: 26
End: 2021-10-10

## 2022-01-30 ENCOUNTER — HEALTH MAINTENANCE LETTER (OUTPATIENT)
Age: 27
End: 2022-01-30

## 2022-03-01 ENCOUNTER — TELEPHONE (OUTPATIENT)
Dept: PSYCHIATRY | Facility: CLINIC | Age: 27
End: 2022-03-01
Payer: COMMERCIAL

## 2022-03-01 NOTE — TELEPHONE ENCOUNTER
PSYCHIATRY CLINIC PHONE INTAKE     SERVICES REQUESTED / INTERESTED IN          Individual Psychotherapy     Presenting Problem and Brief History                              What would you like to be seen for? (brief description):  Patient diagnosed with OCD- has been seeing a provider Max Lynch virtually and Dr. Webb was recommended for patient to continue seeing.  Have you received a mental health diagnosis? Yes   Which one (s): OCD  Is there any history of developmental delay?  No   Are you currently seeing a mental health provider?  Yes            Who / month last seen:  Seeing Max Lynch  Do you have mental health records elsewhere?  Yes  Will you sign a release so we can obtain them?  Yes    Have you ever been hospitalized for psychiatric reasons?  No  Describe:  No    Do you have current thoughts of self-harm?  No    Do you currently have thoughts of harming others?  No       Substance Use History     Do you have any history of alcohol / illicit drug use?  No  Describe:  NA  Have you ever received treatment for this?  No    Describe:  NA     Social History     Who is the patient's a guardian?  Yes    Name / number: Self  Have you had an ACT team in last 12 months?  No  Describe: NA   OK to leave a detailed voicemail?  Yes    Would you be interested in learning more about research opportunities for which you or your child may qualify? We can connect you with a team member for more information.  No  If yes, send an inbasket message to Shakira Persaud    Medical/ Surgical History                                 There is no problem list on file for this patient.         Medications             Current Outpatient Medications   Medication Sig Dispense Refill     hydroquinone (RENETTA) 4 % external cream Apply daily to face, stop after 3 months. 28.35 g 1     oxyCODONE (ROXICODONE) 5 MG tablet Take 1 tablet (5 mg) by mouth every 6 hours as needed for pain 8 tablet 0     tretinoin (RETIN-A) 0.025 % external  cream Apply pea-sized amount to face at bedtime. Start every other night for 1-2 weeks, then increase to nightly as tolerated. 45 g 11         DISPOSITION      Completed phone screen with patient and sent to Dr. Webb and Roby Loya for approval.     Elizabeth Chang,

## 2022-03-04 ENCOUNTER — VIRTUAL VISIT (OUTPATIENT)
Dept: PSYCHIATRY | Facility: CLINIC | Age: 27
End: 2022-03-04
Attending: PSYCHIATRY & NEUROLOGY
Payer: COMMERCIAL

## 2022-03-04 DIAGNOSIS — F42.2 MIXED OBSESSIONAL THOUGHTS AND ACTS: Primary | ICD-10-CM

## 2022-03-04 PROCEDURE — 90791 PSYCH DIAGNOSTIC EVALUATION: CPT | Mod: 95

## 2022-03-04 ASSESSMENT — ANXIETY QUESTIONNAIRES
2. NOT BEING ABLE TO STOP OR CONTROL WORRYING: MORE THAN HALF THE DAYS
7. FEELING AFRAID AS IF SOMETHING AWFUL MIGHT HAPPEN: SEVERAL DAYS
4. TROUBLE RELAXING: SEVERAL DAYS
6. BECOMING EASILY ANNOYED OR IRRITABLE: SEVERAL DAYS
7. FEELING AFRAID AS IF SOMETHING AWFUL MIGHT HAPPEN: SEVERAL DAYS
1. FEELING NERVOUS, ANXIOUS, OR ON EDGE: MORE THAN HALF THE DAYS
5. BEING SO RESTLESS THAT IT IS HARD TO SIT STILL: SEVERAL DAYS
GAD7 TOTAL SCORE: 10
3. WORRYING TOO MUCH ABOUT DIFFERENT THINGS: MORE THAN HALF THE DAYS
GAD7 TOTAL SCORE: 10
GAD7 TOTAL SCORE: 10

## 2022-03-05 ASSESSMENT — ANXIETY QUESTIONNAIRES: GAD7 TOTAL SCORE: 10

## 2022-03-11 ENCOUNTER — VIRTUAL VISIT (OUTPATIENT)
Dept: PSYCHIATRY | Facility: CLINIC | Age: 27
End: 2022-03-11
Attending: PSYCHIATRY & NEUROLOGY
Payer: COMMERCIAL

## 2022-03-11 DIAGNOSIS — F42.2 MIXED OBSESSIONAL THOUGHTS AND ACTS: Primary | ICD-10-CM

## 2022-03-11 PROCEDURE — 90837 PSYTX W PT 60 MINUTES: CPT | Mod: 95

## 2022-03-17 NOTE — PROGRESS NOTES
OUTPATIENT PSYCHOTHERAPY PROGRESS NOTE    Client Name: Toin Don   YOB: 1995 (26 year old)   Date of Service:  Mar 4, 2022  Time of Service: 1:00 to 2:00 (60 minutes)  Service Type(s): 6006512    Type of service: Telemedicine Psychotherapy  Reason for Telemedicine Visit: COVID-19 public health recommendations on in-person sessions  Mode of transmission: Secure real time interactive audio and visual telecommunication system (videoconference via Zoom)  Location of originating and distant sites:    Originating site (patient location): patient home    Distant site (provider site): HIPAA compliant location within provider home/remote setting  Telemedicine Visit: The patient's condition can be safely assessed and treated via synchronous audio and visual telemedicine encounter.    Patient has agreed to receiving services via telemedicine technology.    Diagnoses:   Encounter Diagnosis   Name Primary?     Mixed obsessional thoughts and acts Yes       Individuals Present:   Patient, therapist, and Dr. Webb for 10 minutes    Chief Complaint:     Patient reports to therapy with OCD symptoms. Obsessions include a pervasive feeling that he does not understand something; compulsions include persistent checking/re-checking for understanding. Patient notes that he experiences particular anxiety around  edge-cases  and  absolute limits . Patient also endorses obsessions about his appearance and finding the perfect romantic partner.      Patient notes that he has previously experienced similar obsessional distress and compulsions but that these symptoms remained isolated to stressful exam periods. Current obsessions and compulsions have persisted over the past 4 years despite some improvement from talk therapy. Patient notes that these persistent symptoms have made him somewhat depressed.       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      Hospitalization: None          Psychotherapy: Patient saw Dr. Max Lynch for 1.5 years    Past/Current Chemical Dependency: no      Family History:     Mother: Uncle insomnia/depression     Father: none noted    Siblings: none noted    Social History:      Birth through high school: Patient notes that he had friends and performed well academically throughout primarily school.      Post-secondary education: Patient also performed well academically in college but began to experience anxiety surrounding academic performance.       Work: Patient is a PhD candidate at the [employer]    Treatment plan: 10 session ERP      Roby Loya  Practicum Student

## 2022-03-25 ENCOUNTER — VIRTUAL VISIT (OUTPATIENT)
Dept: PSYCHIATRY | Facility: CLINIC | Age: 27
End: 2022-03-25
Attending: PSYCHIATRY & NEUROLOGY
Payer: COMMERCIAL

## 2022-03-25 DIAGNOSIS — F42.2 MIXED OBSESSIONAL THOUGHTS AND ACTS: Primary | ICD-10-CM

## 2022-03-25 PROCEDURE — 90837 PSYTX W PT 60 MINUTES: CPT | Mod: 95

## 2022-04-08 ENCOUNTER — VIRTUAL VISIT (OUTPATIENT)
Dept: PSYCHIATRY | Facility: CLINIC | Age: 27
End: 2022-04-08
Attending: PSYCHIATRY & NEUROLOGY
Payer: COMMERCIAL

## 2022-04-08 DIAGNOSIS — F42.2 MIXED OBSESSIONAL THOUGHTS AND ACTS: Primary | ICD-10-CM

## 2022-04-08 PROCEDURE — 90837 PSYTX W PT 60 MINUTES: CPT | Mod: 95

## 2022-04-15 ENCOUNTER — VIRTUAL VISIT (OUTPATIENT)
Dept: PSYCHIATRY | Facility: CLINIC | Age: 27
End: 2022-04-15
Attending: PSYCHIATRY & NEUROLOGY
Payer: COMMERCIAL

## 2022-04-15 DIAGNOSIS — F42.2 MIXED OBSESSIONAL THOUGHTS AND ACTS: Primary | ICD-10-CM

## 2022-04-15 PROCEDURE — 90837 PSYTX W PT 60 MINUTES: CPT | Mod: U7

## 2022-04-18 NOTE — PROGRESS NOTES
I (Donnell Webb, Ph.D., ) reviewed this note and agree with its contents. I was not present during this encounter.  This session will be discussed prior to the patient's next scheduled clinic appointment.

## 2022-04-18 NOTE — PROGRESS NOTES
"OUTPATIENT PSYCHOTHERAPY PROGRESS NOTE    Client Name: Toni Don   YOB: 1995 (26 year old)   Date of Service:  Apr 8, 2022  Time of Service: 12:00 to 1:00 (60 minutes)  Service Type(s): 0974342    Type of service: Telemedicine Psychotherapy  Reason for Telemedicine Visit: COVID-19 public health recommendations on in-person sessions  Mode of transmission: Secure real time interactive audio and visual telecommunication system (videoconference via Zoom)  Location of originating and distant sites:    Originating site (patient location): patient home    Distant site (provider site): HIPAA compliant location within provider home/remote setting  Telemedicine Visit: The patient's condition can be safely assessed and treated via synchronous audio and visual telemedicine encounter.    Patient has agreed to receiving services via telemedicine technology.    Diagnoses:   Encounter Diagnosis   Name Primary?     Mixed obsessional thoughts and acts Yes       Individuals Present:   Patient and therapist    Subjective:  Patient reports that he was able to successfully combat mental compulsions 12 of the last 14 days but felt \"less disciplined\" last couple days.     Treatment:   We discussed patient's progress and the strategies he has used to achieve this progress. We agreed on new HW assignment.     Assessment and Progress:   Patient has been able to successfully decrease mental compulsions for a sustained period of time.     Plan:   Patient will return on 4/15. For HW, he will listen to imaginal exposure and continue trying to thwart compulsions.     Roby Loya  Practicum Student        "

## 2022-04-18 NOTE — PROGRESS NOTES
OUTPATIENT PSYCHOTHERAPY PROGRESS NOTE    Client Name: Toni Don   YOB: 1995 (26 year old)   Date of Service:  Mar 11, 2022  Time of Service: 12:00 to 1:00 (60 minutes)  Service Type(s): 8012165    Type of service: Telemedicine Psychotherapy  Reason for Telemedicine Visit: COVID-19 public health recommendations on in-person sessions  Mode of transmission: Secure real time interactive audio and visual telecommunication system (videoconference via Zoom)  Location of originating and distant sites:    Originating site (patient location): patient home    Distant site (provider site): HIPAA compliant location within provider home/remote setting  Telemedicine Visit: The patient's condition can be safely assessed and treated via synchronous audio and visual telemedicine encounter.    Patient has agreed to receiving services via telemedicine technology.    Diagnoses:   Encounter Diagnosis   Name Primary?     Mixed obsessional thoughts and acts Yes       Individuals Present:   Patient and therapist    Subjective:  Patient reports that he was able to successfully combat mental compulsions for the most part this week.     Treatment:   I validated patient's hard work and we discussed the habitual nature of his obsessions and subsequent mental compulsions. We performed imaginal exposure and agreed on new HW assignment.      Assessment and Progress:   Patient is extremely bright and insightful. Ability to distinguish between adaptive deep thinking and maladaptive mental compulsions bodes well for tx.    Plan:   Patient will return on 3/18. For HW, he will:    -2x listen to exposure    -whenever experience beginning of mental compulsion, to effortfully stop self: this is OCD, I m not going to fully understand this      Roby Loya  Practicum Student

## 2022-04-18 NOTE — PROGRESS NOTES
"OUTPATIENT PSYCHOTHERAPY PROGRESS NOTE    Client Name: Toni Don   YOB: 1995 (26 year old)   Date of Service:  Mar 25, 2022  Time of Service: 12:00 to 1:00 (60 minutes)  Service Type(s): [id number]    Type of service: Telemedicine Psychotherapy  Reason for Telemedicine Visit: COVID-19 public health recommendations on in-person sessions  Mode of transmission: Secure real time interactive audio and visual telecommunication system (videoconference via Zoom)  Location of originating and distant sites:    Originating site (patient location): patient home    Distant site (provider site): HIPAA compliant location within provider home/remote setting  Telemedicine Visit: The patient's condition can be safely assessed and treated via synchronous audio and visual telemedicine encounter.    Patient has agreed to receiving services via telemedicine technology.    Diagnoses:   Encounter Diagnosis   Name Primary?     Mixed obsessional thoughts and acts Yes       Individuals Present:   Patient and therapist    Subjective:  Patient reports that he was able to successfully thwart mental compulsions for first half of the week but felt \"less disciplined\" for the second half.     Treatment:   We discussed patient's progress. He noted a clear link between compulsions and anxiety. We discussed the need to decrease habitual performance of compulsions and create new thought pattern.    Assessment and Progress:   Patient's compulsions continue to reduce but remain likely to pop up when patient is not actively engaged in compulsion elimination.      Plan:   Patient will return on 4/8. For HW, he will listen to imaginal exposure and continue focusing on compulsion elimination.       Roby Loya  Practicum Student        "

## 2022-04-18 NOTE — PROGRESS NOTES
OUTPATIENT PSYCHOTHERAPY PROGRESS NOTE    Client Name: Toni Don   YOB: 1995 (26 year old)   Date of Service:  Apr 15, 2022  Time of Service: 1:30 to 2:30 (60 minutes)  Service Type(s): [id number]    Type of service: Telemedicine Psychotherapy  Reason for Telemedicine Visit: COVID-19 public health recommendations on in-person sessions  Mode of transmission: Secure real time interactive audio and visual telecommunication system (videoconference via Zoom)  Location of originating and distant sites:    Originating site (patient location): patient home    Distant site (provider site): HIPAA compliant location within provider home/remote setting  Telemedicine Visit: The patient's condition can be safely assessed and treated via synchronous audio and visual telemedicine encounter.    Patient has agreed to receiving services via telemedicine technology.    Diagnoses:   Encounter Diagnosis   Name Primary?     Mixed obsessional thoughts and acts Yes       Individuals Present:   Patient and therapist    Subjective:  Patient reports that he was able to combat mental compulsions 50% of the time this week.     Treatment:   We discussed patient's difficulty thwarting mental compulsions this week. He noted a link between compulsion performance and depressive symptoms. We performed cognitive restructuring on self-critical thoughts and agreed on new HW.    Assessment and Progress:   Patient's mental compulsions are reduced but remain somewhat resistant to tx thus far.    Plan:   Patient will return on 4/22. For HW, he will redouble his efforts and focus on 100% compulsion elimination.      Roby Loya  Practicum Student

## 2022-04-22 ENCOUNTER — VIRTUAL VISIT (OUTPATIENT)
Dept: PSYCHIATRY | Facility: CLINIC | Age: 27
End: 2022-04-22
Attending: PSYCHIATRY & NEUROLOGY
Payer: COMMERCIAL

## 2022-04-22 DIAGNOSIS — F42.2 MIXED OBSESSIONAL THOUGHTS AND ACTS: Primary | ICD-10-CM

## 2022-04-22 PROCEDURE — 90837 PSYTX W PT 60 MINUTES: CPT | Mod: 95

## 2022-04-29 ENCOUNTER — VIRTUAL VISIT (OUTPATIENT)
Dept: PSYCHIATRY | Facility: CLINIC | Age: 27
End: 2022-04-29
Attending: PSYCHIATRY & NEUROLOGY
Payer: COMMERCIAL

## 2022-04-29 DIAGNOSIS — F42.2 MIXED OBSESSIONAL THOUGHTS AND ACTS: Primary | ICD-10-CM

## 2022-04-29 PROCEDURE — 90837 PSYTX W PT 60 MINUTES: CPT | Mod: 95

## 2022-05-12 NOTE — PROGRESS NOTES
"OUTPATIENT PSYCHOTHERAPY PROGRESS NOTE    Client Name: Toni Don   YOB: 1995 (26 year old)   Date of Service:  Apr 29, 2022  Time of Service: 1:00 to 2:00 (60 minutes)  Service Type(s): [id number]    Type of service: Telemedicine Psychotherapy  Reason for Telemedicine Visit: COVID-19 public health recommendations on in-person sessions  Mode of transmission: Secure real time interactive audio and visual telecommunication system (videoconference via Zoom)  Location of originating and distant sites:    Originating site (patient location): patient home    Distant site (provider site): HIPAA compliant location within provider home/remote setting  Telemedicine Visit: The patient's condition can be safely assessed and treated via synchronous audio and visual telemedicine encounter.    Patient has agreed to receiving services via telemedicine technology.    Diagnoses:   Encounter Diagnosis   Name Primary?     Mixed obsessional thoughts and acts Yes       Individuals Present:   Patient and therapist    Subjective:  Patient reports that this week was \"definitely better than last week\".     Treatment:   I validated patient's successful HW completion. We discussed principles/procedures of ERP and ways in which he can optimally expose himself to obsessional concerns. We agreed on new HW assignment.    Assessment and Progress:   Patient's checking has generally reduced but obsessional concerns remain somewhat persistent.    Plan:   Patient will return to tx on 5/5. For HW, he will:    -stop self 3 out of 4 times    -listen to imaginals    -exposure: saying things confidently      Roby Loya  Practicum Student          "

## 2022-05-12 NOTE — PROGRESS NOTES
"OUTPATIENT PSYCHOTHERAPY PROGRESS NOTE    Client Name: Toni Don   YOB: 1995 (26 year old)   Date of Service:  Apr 22, 2022  Time of Service: 1:00 to 2:00 (60 minutes)  Service Type(s): [id number]    Type of service: Telemedicine Psychotherapy  Reason for Telemedicine Visit: COVID-19 public health recommendations on in-person sessions  Mode of transmission: Secure real time interactive audio and visual telecommunication system (videoconference via Zoom)  Location of originating and distant sites:    Originating site (patient location): patient home    Distant site (provider site): HIPAA compliant location within provider home/remote setting  Telemedicine Visit: The patient's condition can be safely assessed and treated via synchronous audio and visual telemedicine encounter.    Patient has agreed to receiving services via telemedicine technology.    Diagnoses:   Encounter Diagnosis   Name Primary?     Mixed obsessional thoughts and acts Yes       Individuals Present:   Patient and therapist    Subjective:  Patient reports that this week was \"ok\".     Treatment:   Patient reports that he did not \"completely follow through\" on not checking. We discussed ways in which to break through current plateau, including exposing himself to concern that he is not smart enough to understand whatever he feels compelled to mentally review. We agreed on new HW.     Assessment and Progress:   Patient's checking reduction has generally plateaued in recent weeks.      Plan:   Patient will return on 4/28. For HW, he will:    -expose self to belief: then functional (get fear going, sit in it: increase tolerance, fear will go down, learn is not true)  probably true, I don t know, could be rote memorization     Roby Loya  Practicum Student        "

## 2022-05-20 ENCOUNTER — APPOINTMENT (OUTPATIENT)
Dept: PSYCHIATRY | Facility: CLINIC | Age: 27
End: 2022-05-20
Attending: PSYCHIATRY & NEUROLOGY
Payer: COMMERCIAL

## 2022-05-22 ENCOUNTER — HOSPITAL ENCOUNTER (EMERGENCY)
Facility: CLINIC | Age: 27
Discharge: HOME | End: 2022-05-22
Attending: EMERGENCY MEDICINE | Admitting: EMERGENCY MEDICINE
Payer: COMMERCIAL

## 2022-05-22 VITALS
DIASTOLIC BLOOD PRESSURE: 74 MMHG | SYSTOLIC BLOOD PRESSURE: 133 MMHG | RESPIRATION RATE: 20 BRPM | HEART RATE: 64 BPM | HEIGHT: 67 IN | TEMPERATURE: 98.3 F | WEIGHT: 149.91 LBS | BODY MASS INDEX: 23.53 KG/M2 | OXYGEN SATURATION: 100 %

## 2022-05-22 DIAGNOSIS — T78.3XXA ANGIOEDEMA, INITIAL ENCOUNTER: ICD-10-CM

## 2022-05-22 DIAGNOSIS — T78.40XA ALLERGIC REACTION, INITIAL ENCOUNTER: ICD-10-CM

## 2022-05-22 PROCEDURE — 99284 EMERGENCY DEPT VISIT MOD MDM: CPT | Performed by: EMERGENCY MEDICINE

## 2022-05-22 PROCEDURE — 99284 EMERGENCY DEPT VISIT MOD MDM: CPT | Mod: 25 | Performed by: EMERGENCY MEDICINE

## 2022-05-22 PROCEDURE — 96365 THER/PROPH/DIAG IV INF INIT: CPT | Performed by: EMERGENCY MEDICINE

## 2022-05-22 PROCEDURE — 96375 TX/PRO/DX INJ NEW DRUG ADDON: CPT | Performed by: EMERGENCY MEDICINE

## 2022-05-22 PROCEDURE — 250N000011 HC RX IP 250 OP 636: Performed by: EMERGENCY MEDICINE

## 2022-05-22 RX ORDER — METHYLPREDNISOLONE SODIUM SUCCINATE 125 MG/2ML
125 INJECTION, POWDER, LYOPHILIZED, FOR SOLUTION INTRAMUSCULAR; INTRAVENOUS ONCE
Status: COMPLETED | OUTPATIENT
Start: 2022-05-22 | End: 2022-05-22

## 2022-05-22 RX ORDER — CETIRIZINE HYDROCHLORIDE 10 MG/1
10 TABLET ORAL DAILY
Qty: 7 TABLET | Refills: 0 | Status: SHIPPED | OUTPATIENT
Start: 2022-05-22 | End: 2022-05-29

## 2022-05-22 RX ORDER — DIPHENHYDRAMINE HYDROCHLORIDE 50 MG/ML
25 INJECTION INTRAMUSCULAR; INTRAVENOUS ONCE
Status: COMPLETED | OUTPATIENT
Start: 2022-05-22 | End: 2022-05-22

## 2022-05-22 RX ADMIN — FAMOTIDINE 20 MG: 20 INJECTION, SOLUTION INTRAVENOUS at 01:09

## 2022-05-22 RX ADMIN — METHYLPREDNISOLONE SODIUM SUCCINATE 125 MG: 125 INJECTION, POWDER, FOR SOLUTION INTRAMUSCULAR; INTRAVENOUS at 01:00

## 2022-05-22 RX ADMIN — DIPHENHYDRAMINE HYDROCHLORIDE 25 MG: 50 INJECTION, SOLUTION INTRAMUSCULAR; INTRAVENOUS at 00:56

## 2022-05-22 NOTE — ED PROVIDER NOTES
"ED Provider Note  [facility]      History     Chief Complaint   Patient presents with     Allergic Reaction     The history is provided by the patient and medical records.     Toni Don is an otherwise healthy 26 year old male presenting to the ED with concerns for an allergic reaction to shellfish.     Approximately 3 hours prior to presentation has been eating shrimp when he suddenly developed swelling of his right lower lip.  No associated weakness or lightheadedness no abdominal upset no weakness.    16 years ago remembers having something similar as a child where his whole face swelled up but is uncertain what the instigating factor was.    Otherwise well-appearing.  Denies fevers or chills.  No recent trauma.  No known drug or food allergies    Past Medical History  No past medical history on file.  No past surgical history on file.  cetirizine (ZYRTEC) 10 MG tablet  hydroquinone (RENETTA) 4 % external cream  oxyCODONE (ROXICODONE) 5 MG tablet  tretinoin (RETIN-A) 0.025 % external cream      Allergies   Allergen Reactions     Shellfish-Derived Products Angioedema     Family History  No family history on file.  Social History   Social History     Tobacco Use     Smoking status: Never Smoker     Smokeless tobacco: Never Used      Past medical history, past surgical history, medications, allergies, family history, and social history were reviewed with the patient. No additional pertinent items.       Review of Systems   10 point review of symptoms was performed and is negative except as noted above.     Physical Exam   BP: (!) 133/91  Pulse: 71  Temp: 98.3  F (36.8  C)  Resp: 16  Height: 170.2 cm (5' 7\")  Weight: 68 kg (149 lb 14.6 oz)  SpO2: 100 %  Physical Exam  GEN: Well appearing, non toxic, cooperative and conversant.   HEENT: The head is normocephalic and atraumatic. Pupils are equal round and reactive to light. Extraocular motions are intact.  Focal swelling to the right " lower lip.  The neck is nontender and supple.   Oropharynx free of edema no lingular or uvular involvement.  No change in voice.  No hoarseness.  CV: Regular rate and rhythm without murmurs rubs or gallops. 2+ radial pulses bilaterally.  PULM: Clear to auscultation bilaterally.  No wheezing  ABD: Soft, nontender, nondistended.   EXT: Full range of motion.  No edema.  NEURO: Cranial nerves II through XII are intact and symmetric. Bilateral upper and lower extremities grossly show full range of motion without any focal deficits.   SKIN: No rashes, ecchymosis, or lacerations  PSYCH: Calm and cooperative, interactive.     ED Course      Procedures  No results found for any visits on 05/22/22.  Medications   diphenhydrAMINE (BENADRYL) injection 25 mg (25 mg Intravenous Given 5/22/22 0056)   famotidine (PEPCID) infusion 20 mg (0 mg Intravenous Stopped 5/22/22 0139)   methylPREDNISolone sodium succinate (solu-MEDROL) injection 125 mg (125 mg Intravenous Given 5/22/22 0100)        Assessments & Plan (with Medical Decision Making)   26-year-old male presenting with lip swelling as described  DDx includes infection, cellulitis, angioedema, type I hypersensitivity, drug reaction, among other causes    Clinically well-appearing nontoxic.  No evidence of airway involvement on clinical presentation  Exam reassuring    In case of type I hypersensitivity received H1, H2 blockers as well as IV steroids    Patient monitored in the emergency department for 3 hours, 6 hours afer exposure and has had no progression of symptoms.  Lip swelling has significantly decreased and become much less tense, smaller in size    Patient appropriate for discharge.  Will prescribe Zyrtec in case of type I hypersensitivity though more likely this presentation is related to angioedema  Discussed with ED return precautions and plan for follow-up.    - Patient agrees to our plan and is ready and eager for discharge. Care plan, follow up plan, and reasons  to return immediately to the ED were dicussed in detail and summarized as noted in the discharge instructions.      I have reviewed the nursing notes. I have reviewed the findings, diagnosis, plan and need for follow up with the patient.    New Prescriptions    CETIRIZINE (ZYRTEC) 10 MG TABLET    Take 1 tablet (10 mg) by mouth daily for 7 days       Final diagnoses:   Angioedema, initial encounter   Allergic reaction, initial encounter       --  Sterling Cortes MD    [facility] EMERGENCY DEPARTMENT  5/22/2022     Sterling Cortes MD  05/22/22 0317

## 2022-05-22 NOTE — DISCHARGE INSTRUCTIONS
Return immediately to the emergency department for any worsening swelling, change in voice, difficulty breathing, wheezing, shortness of breath or abdominal pain, or any other concerns for worsening    Take Zyrtec as prescribed

## 2022-05-22 NOTE — ED TRIAGE NOTES
Pt arrives ambulatory to triage w/ c/o possible allergic reaction after eating shrimp about 2 hours ago. Bottom lip is swollen, no tongue swelling at this time, airway intact. No rash or hives. States he's never had a reaction to shrimp before but as a kid had an allergic reaction to medication and required epi. VSS.

## 2022-06-28 ENCOUNTER — TELEPHONE (OUTPATIENT)
Dept: BEHAVIORAL HEALTH | Facility: CLINIC | Age: 27
End: 2022-06-28

## 2022-06-28 NOTE — TELEPHONE ENCOUNTER
----- Message from Conchita Martinez, PhD LP sent at 6/24/2022  2:03 PM CDT -----  Regarding: New Eval schedule  [facility] [place] Scheduling Team:    I'd like to schedule the very first CF patient at our (new) [facility] location. Provider will be Jayce Argueta, a psychology practicum student, providing CBT under my supervision. [facility] [[id number][facility] .     Please call/offer Toni an Intake slot (120 minutes) for New Patient eval (as there is no DA on file) In Person or Virtual (per pt preference) with Jayce on 7/11 or following Monday. He was seeing another learner before , so please check on his willingness to come to [facility].     Thanks! Please don't hesitate to reach out to Jayce or me know if you need more info!!    Best,  Conchita

## 2022-09-04 ENCOUNTER — NURSE TRIAGE (OUTPATIENT)
Dept: NURSING | Facility: CLINIC | Age: 27
End: 2022-09-04

## 2022-09-05 NOTE — TELEPHONE ENCOUNTER
"\"Sore throat, fever, cough and body aches began this morning.  T=102 @ 5:30pm. It came down with Ibuprofen.\"   COVID home test tonight = positive. He intends to have it \"double checked\" tomorrow.   He was wondering about any precautions ?     Triaged to a disposition of Home Care: given per guidelines.     Suly Sam RN Triage Nurse Advisor 10:17 PM 9/4/2022    Reason for Disposition    [1] COVID-19 diagnosed by positive lab test (e.g., PCR, rapid self-test kit) AND [2] mild symptoms (e.g., cough, fever, others) AND [3] no complications or SOB    Additional Information    Negative: SEVERE difficulty breathing (e.g., struggling for each breath, speaks in single words)    Negative: Difficult to awaken or acting confused (e.g., disoriented, slurred speech)    Negative: Bluish (or gray) lips or face now    Negative: Shock suspected (e.g., cold/pale/clammy skin, too weak to stand, low BP, rapid pulse)    Negative: Sounds like a life-threatening emergency to the triager    Negative: [1] Diagnosed or suspected COVID-19 AND [2] symptoms lasting 3 or more weeks    Negative: [1] COVID-19 exposure AND [2] no symptoms    Negative: COVID-19 vaccine reaction suspected (e.g., fever, headache, muscle aches) occurring 1 to 3 days after getting vaccine    Negative: COVID-19 vaccine, questions about    Negative: [1] Lives with someone known to have influenza (flu test positive) AND [2] flu-like symptoms (e.g., cough, runny nose, sore throat, SOB; with or without fever)    Negative: [1] Adult with possible COVID-19 symptoms AND [2] triager concerned about severity of symptoms or other causes    Negative: COVID-19 and breastfeeding, questions about    Negative: SEVERE or constant chest pain or pressure  (Exception: Mild central chest pain, present only when coughing.)    Negative: MODERATE difficulty breathing (e.g., speaks in phrases, SOB even at rest, pulse 100-120)    Negative: [1] Headache AND [2] stiff neck (can't touch chin " to chest)    Negative: Oxygen level (e.g., pulse oximetry) 90 percent or lower    Negative: Chest pain or pressure    Negative: Patient sounds very sick or weak to the triager    Negative: MILD difficulty breathing (e.g., minimal/no SOB at rest, SOB with walking, pulse <100)    Negative: Fever > 103 F (39.4 C)    Negative: [1] Fever > 101 F (38.3 C) AND [2] age > 60 years    Negative: [1] Fever > 100.0 F (37.8 C) AND [2] bedridden (e.g., nursing home patient, CVA, chronic illness, recovering from surgery)    Negative: HIGH RISK for severe COVID complications (e.g., weak immune system, age > 64 years, obesity with BMI > 25, pregnant, chronic lung disease or other chronic medical condition)  (Exception: Already seen by PCP and no new or worsening symptoms.)    Negative: [1] HIGH RISK patient AND [2] influenza is widespread in the community AND [3] ONE OR MORE respiratory symptoms: cough, sore throat, runny or stuffy nose    Negative: [1] HIGH RISK patient AND [2] influenza exposure within the last 7 days AND [3] ONE OR MORE respiratory symptoms: cough, sore throat, runny or stuffy nose    Negative: Oxygen level (e.g., pulse oximetry) 91 to 94 percent    Negative: Fever present > 3 days (72 hours)    Negative: [1] Fever returns after gone for over 24 hours AND [2] symptoms worse or not improved    Negative: [1] Continuous (nonstop) coughing interferes with work or school AND [2] no improvement using cough treatment per Care Advice    Negative: [1] COVID-19 infection suspected by caller or triager AND [2] mild symptoms (cough, fever, or others) AND [3] negative COVID-19 rapid test    Negative: Cough present > 3 weeks    Negative: [1] COVID-19 diagnosed by positive lab test (e.g., PCR, rapid self-test kit) AND [2] NO symptoms (e.g., cough, fever, others)    Protocols used: CORONAVIRUS (COVID-19) DIAGNOSED OR [id number] 1.18.2022

## 2022-09-24 ENCOUNTER — HEALTH MAINTENANCE LETTER (OUTPATIENT)
Age: 27
End: 2022-09-24

## 2023-05-08 ENCOUNTER — HEALTH MAINTENANCE LETTER (OUTPATIENT)
Age: 28
End: 2023-05-08

## 2024-07-14 ENCOUNTER — HEALTH MAINTENANCE LETTER (OUTPATIENT)
Age: 29
End: 2024-07-14